# Patient Record
Sex: FEMALE | Race: WHITE | Employment: OTHER | URBAN - METROPOLITAN AREA
[De-identification: names, ages, dates, MRNs, and addresses within clinical notes are randomized per-mention and may not be internally consistent; named-entity substitution may affect disease eponyms.]

---

## 2017-01-25 ENCOUNTER — OFFICE VISIT (OUTPATIENT)
Dept: OBGYN CLINIC | Facility: CLINIC | Age: 27
End: 2017-01-25

## 2017-01-25 VITALS
WEIGHT: 130.38 LBS | BODY MASS INDEX: 24.62 KG/M2 | DIASTOLIC BLOOD PRESSURE: 62 MMHG | SYSTOLIC BLOOD PRESSURE: 104 MMHG | HEIGHT: 61 IN

## 2017-01-25 DIAGNOSIS — T83.32XA IUD STRINGS LOST: Primary | ICD-10-CM

## 2017-01-25 PROCEDURE — 99213 OFFICE O/P EST LOW 20 MIN: CPT | Performed by: OBSTETRICS & GYNECOLOGY

## 2017-01-25 NOTE — PROGRESS NOTES
Germaine Blanton is a 23year old female. HPI:   Patient presents with: Other: IUD CHECK      Here for IUD check.  Mirena placed 2014 with no visible strings last exam.    Medications (Active prior to today's visit):    Current Outpatient Prescription

## 2017-01-30 ENCOUNTER — TELEPHONE (OUTPATIENT)
Dept: OBGYN CLINIC | Facility: CLINIC | Age: 27
End: 2017-01-30

## 2017-02-17 ENCOUNTER — HOSPITAL ENCOUNTER (OUTPATIENT)
Age: 27
Discharge: HOME | End: 2017-02-17
Attending: FAMILY MEDICINE
Payer: COMMERCIAL

## 2017-02-17 VITALS
DIASTOLIC BLOOD PRESSURE: 78 MMHG | SYSTOLIC BLOOD PRESSURE: 139 MMHG | RESPIRATION RATE: 18 BRPM | TEMPERATURE: 98 F | HEART RATE: 90 BPM | OXYGEN SATURATION: 95 %

## 2017-02-17 DIAGNOSIS — S14.3XXA INJURY TO BRACHIAL PLEXUS, INITIAL ENCOUNTER: Primary | ICD-10-CM

## 2017-02-17 PROCEDURE — 99202 OFFICE O/P NEW SF 15 MIN: CPT

## 2017-02-17 PROCEDURE — 99212 OFFICE O/P EST SF 10 MIN: CPT

## 2017-02-17 NOTE — ED INITIAL ASSESSMENT (HPI)
Pt went to [employer] class last night and since then gets numbness and tingling and swelling to right arm with certain movements and positions. Resp easy and regular. Right side shoulder pain. Pt states twisting her arm. 2+radial pulse to right side.  Res

## 2017-02-17 NOTE — ED PROVIDER NOTES
Patient Seen in: [address]    History   No chief complaint on file.     Stated Complaint: RESTRICTED BLOOD FLOW TO HAND RIGHT AND SUDDEN WEAKNESS     HPI    12year-old female who reports intermittent right arm numbness a otherwise stated in HPI.     Physical Exam     ED Triage Vitals   BP 02/17/17 1101 139/78 mmHg   Pulse 02/17/17 1101 90   Resp 02/17/17 1101 18   Temp 02/17/17 1101 97.5 °F (36.4 °C)   Temp src 02/17/17 1101 Oral   SpO2 02/17/17 1101 95 %   O2 Device 02/17/ appointment as soon as possible for a visit in 3 days  If symptoms worsen      Medications Prescribed:  Current Discharge Medication List

## 2018-02-01 ENCOUNTER — HOSPITAL ENCOUNTER (OUTPATIENT)
Age: 28
Discharge: HOME | End: 2018-02-01
Attending: FAMILY MEDICINE
Payer: COMMERCIAL

## 2018-02-01 VITALS
RESPIRATION RATE: 22 BRPM | BODY MASS INDEX: 24.17 KG/M2 | WEIGHT: 128 LBS | HEIGHT: 61 IN | TEMPERATURE: 99 F | DIASTOLIC BLOOD PRESSURE: 85 MMHG | SYSTOLIC BLOOD PRESSURE: 122 MMHG | OXYGEN SATURATION: 100 % | HEART RATE: 107 BPM

## 2018-02-01 DIAGNOSIS — J11.1 INFLUENZA-LIKE ILLNESS: Primary | ICD-10-CM

## 2018-02-01 PROCEDURE — 99213 OFFICE O/P EST LOW 20 MIN: CPT

## 2018-02-01 PROCEDURE — 99214 OFFICE O/P EST MOD 30 MIN: CPT

## 2018-02-01 RX ORDER — OSELTAMIVIR PHOSPHATE 75 MG/1
75 CAPSULE ORAL 2 TIMES DAILY
Qty: 10 CAPSULE | Refills: 0 | Status: SHIPPED | OUTPATIENT
Start: 2018-02-01 | End: 2018-02-06

## 2018-02-01 NOTE — ED PROVIDER NOTES
Patient presents with:  Cough/URI    HPI:     Stef Purcell is a 33year old female who presents with for chief complaint of fevers, chills, chest congestion, cough, headaches and body aches.   Onset of symptoms was 1 days  The patient denies complaint HPI.        Physical Exam:     Findings:    /85   Pulse 107   Temp 99.1 °F (37.3 °C) (Oral)   Resp 22   Ht 154.9 cm (5' 1\")   Wt 58.1 kg   SpO2 100%   BMI 24.19 kg/m²   General appearance: alert, appears stated age and cooperative  Head: Normocephal

## 2018-02-19 ENCOUNTER — OFFICE VISIT (OUTPATIENT)
Dept: OBGYN CLINIC | Facility: CLINIC | Age: 28
End: 2018-02-19

## 2018-02-19 VITALS
SYSTOLIC BLOOD PRESSURE: 106 MMHG | BODY MASS INDEX: 24.92 KG/M2 | WEIGHT: 132 LBS | HEIGHT: 61 IN | DIASTOLIC BLOOD PRESSURE: 58 MMHG

## 2018-02-19 DIAGNOSIS — Z01.419 WOMEN'S ANNUAL ROUTINE GYNECOLOGICAL EXAMINATION: Primary | ICD-10-CM

## 2018-02-19 DIAGNOSIS — Z30.431 ENCOUNTER FOR ROUTINE CHECKING OF INTRAUTERINE CONTRACEPTIVE DEVICE (IUD): ICD-10-CM

## 2018-02-19 DIAGNOSIS — Z12.4 ROUTINE CERVICAL SMEAR: ICD-10-CM

## 2018-02-19 PROCEDURE — 99395 PREV VISIT EST AGE 18-39: CPT | Performed by: OBSTETRICS & GYNECOLOGY

## 2018-02-19 NOTE — PROGRESS NOTES
GYN ANNUAL    2018  10:27 AM    CC:Patient presents for routine visit    HPI: Patient is a 33year old  LMP 2/3/2018 here for annual gyn exam, due for PAP. Cycles light with Mirena IUD. No gynecologic complaints. No pelvic pain.  No abnormal vag of Systems:  General: no complaints  Eyes: no complaints  Respiratory: no complaints  Cardiovascular: no complaints  GI: denies abdominal pain, diarrhea, constipation  : no complaints, denies dysuria, increased urinary frequency  Hematological/lymphatic:

## 2018-02-21 LAB — HPV MRNA E6/E7: NOT DETECTED

## 2018-04-09 ENCOUNTER — HOSPITAL ENCOUNTER (OUTPATIENT)
Age: 28
Discharge: HOME | End: 2018-04-09
Attending: FAMILY MEDICINE
Payer: COMMERCIAL

## 2018-04-09 VITALS
SYSTOLIC BLOOD PRESSURE: 144 MMHG | HEART RATE: 124 BPM | HEIGHT: 61 IN | OXYGEN SATURATION: 100 % | BODY MASS INDEX: 23.6 KG/M2 | DIASTOLIC BLOOD PRESSURE: 89 MMHG | TEMPERATURE: 98 F | RESPIRATION RATE: 18 BRPM | WEIGHT: 125 LBS

## 2018-04-09 DIAGNOSIS — N76.0 ACUTE VAGINITIS: Primary | ICD-10-CM

## 2018-04-09 PROCEDURE — 87491 CHLMYD TRACH DNA AMP PROBE: CPT | Performed by: FAMILY MEDICINE

## 2018-04-09 PROCEDURE — 99214 OFFICE O/P EST MOD 30 MIN: CPT

## 2018-04-09 PROCEDURE — 87205 SMEAR GRAM STAIN: CPT | Performed by: FAMILY MEDICINE

## 2018-04-09 PROCEDURE — 87591 N.GONORRHOEAE DNA AMP PROB: CPT | Performed by: FAMILY MEDICINE

## 2018-04-09 PROCEDURE — 87808 TRICHOMONAS ASSAY W/OPTIC: CPT | Performed by: FAMILY MEDICINE

## 2018-04-09 PROCEDURE — 87106 FUNGI IDENTIFICATION YEAST: CPT | Performed by: FAMILY MEDICINE

## 2018-04-09 RX ORDER — TERCONAZOLE 80 MG/1
80 SUPPOSITORY VAGINAL NIGHTLY
Qty: 3 SUPPOSITORY | Refills: 0 | Status: SHIPPED | OUTPATIENT
Start: 2018-04-09 | End: 2018-04-12

## 2018-04-09 NOTE — ED PROVIDER NOTES
Patient Seen in: [address]    History   Patient presents with:  Vaginal Problem    Stated Complaint: YEAST INFECTION     HPI  31-year-old female presents with 1 week of vaginal itching and discharge with odor.   Also had Former Smoker                                                              Packs/day: 0.00      Years: 2.00         Quit date: 2/17/2014  Smokeless tobacco: Never Used                      Alcohol use:  No                Review of Systems    Positive for st [phone]  ------------------------------------------------------------  We will treat for a yeast infection while BV panel and GC chlamydia are pending. Discussed fluconazole versus Terazol and patient opts for Terazol suppositories.   Any new or worsening sym
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
## 2019-05-17 ENCOUNTER — OFFICE VISIT (OUTPATIENT)
Dept: OBGYN CLINIC | Facility: CLINIC | Age: 29
End: 2019-05-17
Payer: COMMERCIAL

## 2019-05-17 VITALS
BODY MASS INDEX: 24.05 KG/M2 | DIASTOLIC BLOOD PRESSURE: 70 MMHG | WEIGHT: 127.38 LBS | SYSTOLIC BLOOD PRESSURE: 104 MMHG | HEIGHT: 61 IN

## 2019-05-17 DIAGNOSIS — Z30.431 ENCOUNTER FOR ROUTINE CHECKING OF INTRAUTERINE CONTRACEPTIVE DEVICE (IUD): ICD-10-CM

## 2019-05-17 DIAGNOSIS — Z01.419 WOMEN'S ANNUAL ROUTINE GYNECOLOGICAL EXAMINATION: Primary | ICD-10-CM

## 2019-05-17 DIAGNOSIS — N89.8 VAGINAL DISCHARGE: ICD-10-CM

## 2019-05-17 PROBLEM — T83.32XA IUD STRINGS LOST: Status: RESOLVED | Noted: 2017-01-25 | Resolved: 2019-05-17

## 2019-05-17 PROCEDURE — 87106 FUNGI IDENTIFICATION YEAST: CPT | Performed by: OBSTETRICS & GYNECOLOGY

## 2019-05-17 PROCEDURE — 99395 PREV VISIT EST AGE 18-39: CPT | Performed by: OBSTETRICS & GYNECOLOGY

## 2019-05-17 PROCEDURE — 87808 TRICHOMONAS ASSAY W/OPTIC: CPT | Performed by: OBSTETRICS & GYNECOLOGY

## 2019-05-17 PROCEDURE — 87205 SMEAR GRAM STAIN: CPT | Performed by: OBSTETRICS & GYNECOLOGY

## 2019-05-17 NOTE — PROGRESS NOTES
GYN ANNUAL    2019  1:59 PM    CC:Patient presents for exam    HPI: Patient is a 30year old  LMP spotting on Mirena placed  here for annual gyn exam and evaluation of discharge that has been flaring whenever bleeding occurs.  Cycles iregula Smoker        Years: 2.00        Quit date: 2014        Years since quittin.2      Smokeless tobacco: Never Used    Substance and Sexual Activity      Alcohol use: No      Drug use: No      Sexual activity: Yes        Birth control/protection: Bill Encounter for routine checking of intrauterine contraceptive device (IUD)  - Return for removal 6/2019 5/17/2019  Clark Syed MD

## 2019-05-20 RX ORDER — FLUCONAZOLE 150 MG/1
150 TABLET ORAL DAILY
Qty: 2 TABLET | Refills: 0 | Status: SHIPPED | OUTPATIENT
Start: 2019-05-20 | End: 2019-06-27 | Stop reason: ALTCHOICE

## 2019-06-27 ENCOUNTER — OFFICE VISIT (OUTPATIENT)
Dept: OBGYN CLINIC | Facility: CLINIC | Age: 29
End: 2019-06-27
Payer: COMMERCIAL

## 2019-06-27 VITALS
BODY MASS INDEX: 24.15 KG/M2 | WEIGHT: 127.88 LBS | SYSTOLIC BLOOD PRESSURE: 100 MMHG | HEIGHT: 61 IN | DIASTOLIC BLOOD PRESSURE: 60 MMHG

## 2019-06-27 DIAGNOSIS — Z30.432 ENCOUNTER FOR REMOVAL OF INTRAUTERINE CONTRACEPTIVE DEVICE: Primary | ICD-10-CM

## 2019-06-27 PROCEDURE — 58301 REMOVE INTRAUTERINE DEVICE: CPT | Performed by: OBSTETRICS & GYNECOLOGY

## 2019-06-28 NOTE — PROGRESS NOTES
[facility]  Obstetrics and Gynecology  IUD Removal Procedure Note  Bri Eid MD    IUD Removal:        Procedure discussed with the patient in detail including indication, risks, benefits, alternatives and complications.    Consen

## 2019-09-05 ENCOUNTER — PATIENT MESSAGE (OUTPATIENT)
Dept: OBGYN CLINIC | Facility: CLINIC | Age: 29
End: 2019-09-05

## 2019-09-05 RX ORDER — NORETHINDRONE ACETATE AND ETHINYL ESTRADIOL 1MG-20(21)
1 KIT ORAL DAILY
Qty: 3 PACKAGE | Refills: 3 | Status: SHIPPED | OUTPATIENT
Start: 2019-09-05 | End: 2020-02-28

## 2019-09-05 NOTE — PROGRESS NOTES
Dony Pritchett MD  You 1 hour ago (1:58 PM)      Ok to prescribe LoEstrin Fe 1/20 #90 with 3RF. RX called in per EB. Pt notified.

## 2019-09-05 NOTE — PROGRESS NOTES
Pt requesting low dose OCP's. Had Mirena IUD removed 6/27/2019, was inserted 2014. LOV for annual 5/17/2019. Note from EB:  \"Cycles iregular on IUD - ready for removal with plans to conceive in future. \"    No future appt at this time

## 2019-09-05 NOTE — TELEPHONE ENCOUNTER
From: Ruby Domínguez  To:  Ryann Green MD  Sent: 9/5/2019 8:21 AM CDT  Subject: Non-Urgent Juju Hasten Dr. Ana Ramey,    I was wondering if I would be able to get a prescription for birth control (the pill - preferably low dose hormones)

## 2020-01-14 ENCOUNTER — PATIENT MESSAGE (OUTPATIENT)
Dept: OBGYN CLINIC | Facility: CLINIC | Age: 30
End: 2020-01-14

## 2020-02-28 ENCOUNTER — OFFICE VISIT (OUTPATIENT)
Dept: OBGYN CLINIC | Facility: CLINIC | Age: 30
End: 2020-02-28
Payer: COMMERCIAL

## 2020-02-28 ENCOUNTER — APPOINTMENT (OUTPATIENT)
Dept: LAB | Facility: REFERENCE LAB | Age: 30
End: 2020-02-28
Attending: OBSTETRICS & GYNECOLOGY
Payer: COMMERCIAL

## 2020-02-28 VITALS
WEIGHT: 136.31 LBS | DIASTOLIC BLOOD PRESSURE: 60 MMHG | BODY MASS INDEX: 25.74 KG/M2 | HEIGHT: 61 IN | SYSTOLIC BLOOD PRESSURE: 100 MMHG

## 2020-02-28 DIAGNOSIS — Z31.69 PRE-CONCEPTION COUNSELING: Primary | ICD-10-CM

## 2020-02-28 DIAGNOSIS — Z82.0 FAMILY HISTORY OF HUNTINGTON'S CHOREA: ICD-10-CM

## 2020-02-28 PROBLEM — Z30.432 ENCOUNTER FOR REMOVAL OF INTRAUTERINE CONTRACEPTIVE DEVICE: Status: RESOLVED | Noted: 2019-06-27 | Resolved: 2020-02-28

## 2020-02-28 PROBLEM — Z30.431 ENCOUNTER FOR ROUTINE CHECKING OF INTRAUTERINE CONTRACEPTIVE DEVICE (IUD): Status: RESOLVED | Noted: 2019-05-17 | Resolved: 2020-02-28

## 2020-02-28 PROCEDURE — 36415 COLL VENOUS BLD VENIPUNCTURE: CPT

## 2020-02-28 PROCEDURE — 99213 OFFICE O/P EST LOW 20 MIN: CPT | Performed by: OBSTETRICS & GYNECOLOGY

## 2020-02-28 NOTE — PROGRESS NOTES
Carmela Son is a 30year old female. HPI:   Patient presents with:  Contraception: IUD consult     Here to discuss pregnancy plans sometime in next year or two. Getting  in fall and timing of becoming pregnant not an issue.  Family history

## 2020-03-09 ENCOUNTER — TELEPHONE (OUTPATIENT)
Dept: OBGYN CLINIC | Facility: CLINIC | Age: 30
End: 2020-03-09

## 2020-03-09 NOTE — TELEPHONE ENCOUNTER
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  is Lluvia Navarrete and will come in for testing for those two conditions only.   Patient to schedule appointment with genetics counse

## 2020-04-07 ENCOUNTER — APPOINTMENT (OUTPATIENT)
Dept: GENETICS | Facility: HOSPITAL | Age: 30
End: 2020-04-07
Attending: GENETIC COUNSELOR, MS
Payer: COMMERCIAL

## 2022-01-17 ENCOUNTER — OFFICE VISIT (OUTPATIENT)
Dept: OBGYN CLINIC | Facility: CLINIC | Age: 32
End: 2022-01-17
Payer: COMMERCIAL

## 2022-01-17 VITALS
HEIGHT: 61 IN | DIASTOLIC BLOOD PRESSURE: 76 MMHG | SYSTOLIC BLOOD PRESSURE: 120 MMHG | BODY MASS INDEX: 25.81 KG/M2 | WEIGHT: 136.69 LBS

## 2022-01-17 DIAGNOSIS — Z12.4 ROUTINE CERVICAL SMEAR: ICD-10-CM

## 2022-01-17 DIAGNOSIS — Z11.3 SCREENING EXAMINATION FOR VENEREAL DISEASE: ICD-10-CM

## 2022-01-17 DIAGNOSIS — Z01.419 WOMEN'S ANNUAL ROUTINE GYNECOLOGICAL EXAMINATION: Primary | ICD-10-CM

## 2022-01-17 PROCEDURE — 99395 PREV VISIT EST AGE 18-39: CPT | Performed by: OBSTETRICS & GYNECOLOGY

## 2022-01-17 PROCEDURE — 3008F BODY MASS INDEX DOCD: CPT | Performed by: OBSTETRICS & GYNECOLOGY

## 2022-01-17 PROCEDURE — 3074F SYST BP LT 130 MM HG: CPT | Performed by: OBSTETRICS & GYNECOLOGY

## 2022-01-17 PROCEDURE — 3078F DIAST BP <80 MM HG: CPT | Performed by: OBSTETRICS & GYNECOLOGY

## 2022-01-17 NOTE — PROGRESS NOTES
GYN ANNUAL    2022  12:22 PM    Patient presents with: Annual: annual exam and pap   . HPI: Patient is a 28year old  LMP 22 presents for annual gyn exam and PAP before beginning IVF. Cycles regular.  Reports no gynecologic issues or c Grandfather      Social History    Socioeconomic History      Marital status:     Tobacco Use      Smoking status: Former Smoker        Years: 2.00        Quit date: 2014        Years since quittin.9      Smokeless tobacco: Never Used    [place]

## 2022-01-18 LAB
CHLAMYDIA TRACHOMATIS$RNA, TMA: NOT DETECTED
HPV MRNA E6/E7: NOT DETECTED
NEISSERIA GONORRHOEAE$RNA, TMA: NOT DETECTED

## 2022-08-29 ENCOUNTER — OFFICE VISIT (OUTPATIENT)
Dept: SURGERY | Facility: CLINIC | Age: 32
End: 2022-08-29
Payer: COMMERCIAL

## 2022-08-29 VITALS
BODY MASS INDEX: 26.66 KG/M2 | SYSTOLIC BLOOD PRESSURE: 108 MMHG | HEIGHT: 61 IN | WEIGHT: 141.19 LBS | DIASTOLIC BLOOD PRESSURE: 72 MMHG

## 2022-08-29 DIAGNOSIS — N89.8 VAGINAL ITCHING: Primary | ICD-10-CM

## 2022-08-29 LAB
GENITAL VAGINOSIS SCREEN: NEGATIVE
TRICHOMONAS SCREEN: NEGATIVE

## 2022-08-29 PROCEDURE — 87106 FUNGI IDENTIFICATION YEAST: CPT | Performed by: OBSTETRICS & GYNECOLOGY

## 2022-08-29 PROCEDURE — 87808 TRICHOMONAS ASSAY W/OPTIC: CPT | Performed by: OBSTETRICS & GYNECOLOGY

## 2022-08-29 PROCEDURE — 87491 CHLMYD TRACH DNA AMP PROBE: CPT | Performed by: OBSTETRICS & GYNECOLOGY

## 2022-08-29 PROCEDURE — 87205 SMEAR GRAM STAIN: CPT | Performed by: OBSTETRICS & GYNECOLOGY

## 2022-08-29 PROCEDURE — 87591 N.GONORRHOEAE DNA AMP PROB: CPT | Performed by: OBSTETRICS & GYNECOLOGY

## 2022-08-29 RX ORDER — ESTRADIOL 0.1 MG/D
FILM, EXTENDED RELEASE TRANSDERMAL
COMMUNITY
Start: 2022-08-26

## 2022-08-29 RX ORDER — CABERGOLINE 0.5 MG/1
0.5 TABLET ORAL DAILY
COMMUNITY
Start: 2022-07-01

## 2022-08-29 RX ORDER — PROGESTERONE 200 MG/1
CAPSULE ORAL
COMMUNITY
Start: 2022-08-19

## 2022-08-29 RX ORDER — PROGESTERONE 100 MG/1
INSERT VAGINAL
COMMUNITY
Start: 2022-08-19

## 2022-08-29 RX ORDER — ESTRADIOL 2 MG/1
2 TABLET ORAL DAILY
COMMUNITY
Start: 2022-08-19

## 2022-08-29 RX ORDER — PROGESTERONE 50 MG/ML
INJECTION, SOLUTION INTRAMUSCULAR
COMMUNITY
Start: 2022-08-22

## 2022-08-29 RX ORDER — LEUPROLIDE ACETATE 1 MG/0.2ML
KIT SUBCUTANEOUS
COMMUNITY
Start: 2022-08-26

## 2022-08-30 LAB
C TRACH DNA SPEC QL NAA+PROBE: NEGATIVE
N GONORRHOEA DNA SPEC QL NAA+PROBE: NEGATIVE

## 2022-08-31 LAB
GENITAL VAGINOSIS SCREEN: NEGATIVE
TRICHOMONAS SCREEN: NEGATIVE

## 2022-10-04 ENCOUNTER — TELEPHONE (OUTPATIENT)
Dept: SURGERY | Facility: CLINIC | Age: 32
End: 2022-10-04

## (undated) NOTE — ED AVS SNAPSHOT
[facility] in [place] [address]    Phone:  [address]   MRN: [id number]    Department:  [facility]   Date of Visit:  2/17/2017 deductible, co-payment, or co-insurance and for other services not covered under your health insurance plan. Please contact your insurance company and physician's office to determine coverage and benefits available for follow-up care and referrals.      It continue to take your medications as instructed by your Primary Care doctor until you can check with your doctor. Please bring the medication list to your next doctor's appointment.     Any imaging studies and labs completed today can be reviewed in your M can help with your Affordable Care Act coverage, as well as all types of Medicaid plans. To get signed up and covered, please call [phone] and ask to get set up for an insurance coverage that is in-network with Katlyn Pandya

## (undated) NOTE — LETTER
Analy Rey, :1990    CONSENT FOR PROCEDURE/SEDATION    1. I authorize the performance upon Analy Rey  the following: Mirena IUD removal     2.  I authorize Dr. Melinda Ellis MD (and whomever is designated as the doctor’s linh Relationship to patient: ____________________________________________    Witness: _________________________________________ Date:___________     Physician Signature: _______________________________ Date:___________

## (undated) NOTE — LETTER
[address]  [address] [facility] [address]  Dept: [phone]      February 1, 2018    Patient: Luz Marina Blanco   Date of Visit: 2/1/2018       To Whom It May Concern:    Ramin Vasques was seen and treated in ou